# Patient Record
Sex: FEMALE | Race: BLACK OR AFRICAN AMERICAN | Employment: FULL TIME | URBAN - METROPOLITAN AREA
[De-identification: names, ages, dates, MRNs, and addresses within clinical notes are randomized per-mention and may not be internally consistent; named-entity substitution may affect disease eponyms.]

---

## 2023-02-09 ENCOUNTER — HOSPITAL ENCOUNTER (EMERGENCY)
Age: 23
Discharge: HOME | End: 2023-02-09
Attending: EMERGENCY MEDICINE
Payer: COMMERCIAL

## 2023-02-09 ENCOUNTER — APPOINTMENT (OUTPATIENT)
Dept: GENERAL RADIOLOGY | Age: 23
End: 2023-02-09
Payer: COMMERCIAL

## 2023-02-09 VITALS
DIASTOLIC BLOOD PRESSURE: 86 MMHG | WEIGHT: 198 LBS | BODY MASS INDEX: 33.8 KG/M2 | HEART RATE: 77 BPM | TEMPERATURE: 98.6 F | HEIGHT: 64 IN | OXYGEN SATURATION: 100 % | RESPIRATION RATE: 16 BRPM | SYSTOLIC BLOOD PRESSURE: 122 MMHG

## 2023-02-09 DIAGNOSIS — R09.89 GLOBUS SENSATION: Primary | ICD-10-CM

## 2023-02-09 LAB — STREP, MOLECULAR: NOT DETECTED

## 2023-02-09 PROCEDURE — 87651 STREP A DNA AMP PROBE: CPT

## 2023-02-09 PROCEDURE — 70360 X-RAY EXAM OF NECK: CPT

## 2023-02-09 PROCEDURE — 99283 EMERGENCY DEPT VISIT LOW MDM: CPT

## 2023-02-09 RX ORDER — ALBUTEROL SULFATE 90 UG/1
2 AEROSOL, METERED RESPIRATORY (INHALATION) EVERY 6 HOURS PRN
COMMUNITY
End: 2023-02-09

## 2023-02-09 RX ORDER — HYOSCYAMINE SULFATE 0.125 MG
125 TABLET ORAL EVERY 4 HOURS PRN
COMMUNITY

## 2023-02-09 RX ORDER — ALBUTEROL SULFATE 90 UG/1
2 AEROSOL, METERED RESPIRATORY (INHALATION) EVERY 4 HOURS PRN
COMMUNITY
Start: 2023-01-17

## 2023-02-09 ASSESSMENT — ENCOUNTER SYMPTOMS
VOICE CHANGE: 0
SORE THROAT: 1
TROUBLE SWALLOWING: 0

## 2023-02-09 ASSESSMENT — LIFESTYLE VARIABLES
HOW OFTEN DO YOU HAVE A DRINK CONTAINING ALCOHOL: NEVER
HOW MANY STANDARD DRINKS CONTAINING ALCOHOL DO YOU HAVE ON A TYPICAL DAY: PATIENT DOES NOT DRINK

## 2023-02-09 ASSESSMENT — PAIN SCALES - GENERAL: PAINLEVEL_OUTOF10: 3

## 2023-02-09 ASSESSMENT — PAIN - FUNCTIONAL ASSESSMENT: PAIN_FUNCTIONAL_ASSESSMENT: 0-10

## 2023-02-09 NOTE — ED TRIAGE NOTES
Reports feeling like something is stuck in her throat since Monday after eating. No problems talking, swallowing or taking in PO.  Reports discomfort

## 2023-02-09 NOTE — ED NOTES
I have reviewed discharge instructions with the patient. The patient verbalized understanding. Patient left ED via Discharge Method: ambulatory to Home. Opportunity for questions and clarification provided. Patient given 0 scripts. To continue your aftercare when you leave the hospital, you may receive an automated call from our care team to check in on how you are doing. This is a free service and part of our promise to provide the best care and service to meet your aftercare needs.  If you have questions, or wish to unsubscribe from this service please call [phone]. Thank you for Choosing our [address] [place] [address] Emergency Department.         Kylie Escalera RN  02/09/23 2304

## 2023-02-09 NOTE — ED PROVIDER NOTES
Emergency Department Provider Note                   PCP:                Asuncion Snow MD               Age: 20 y.o. Sex: female     No diagnosis found. DISPOSITION          Medical Decision Making  We will obtain an x-ray to evaluate for any radiopaque foreign body as well as any evidence of inflammation around the larynx or glottis. We will also check a strep test for possible early infection. Amount and/or Complexity of Data Reviewed  Labs: ordered. Radiology: ordered and independent interpretation performed. Decision-making details documented in ED Course. Details: No abnormality noted per my interpretation       Complexity of Problem: 1 self limited or minor problem. (2)    I have conducted an independent ordering and review of X-rays. Orders Placed This Encounter   Procedures    Group A Strep Screen By PCR    XR NECK SOFT TISSUE        Medications - No data to display    New Prescriptions    No medications on file        Anthony Downing is a 20 y.o. female who presents to the Emergency Department with chief complaint of    Chief Complaint   Patient presents with    Food Intolerance      Patient presents for evaluation, complaining of the feeling of something stuck in her throat after having eaten some food from [employer] 2 days ago. She reports a persistent sensation of foreign body in the throat although she reports no significant worsening pain with swallowing. And she is able to swallow fluids as well as solids. She denies any fever chills or cough. Review of systems is otherwise negative. The history is provided by the patient. Review of Systems   Constitutional:  Negative for chills and fever. HENT:  Positive for sore throat. Negative for trouble swallowing and voice change. All other systems reviewed and are negative. No past medical history on file. No past surgical history on file. No family history on file.      Social History Socioeconomic History    Marital status: Single         Shellfish-derived products     Previous Medications    ALBUTEROL SULFATE HFA (PROVENTIL;VENTOLIN;PROAIR) 108 (90 BASE) MCG/ACT INHALER    Inhale 2 puffs into the lungs every 4 hours as needed    BUDESONIDE (UCERIS PO)    Take 9 mg by mouth    HYOSCYAMINE (ANASPAZ;LEVSIN) 125 MCG TABLET    Take 125 mcg by mouth every 4 hours as needed for Cramping        Vitals signs and nursing note reviewed. Patient Vitals for the past 4 hrs:   Temp Pulse Resp BP SpO2   02/09/23 1158 98.6 °F (37 °C) (!) 106 17 132/89 100 %          Physical Exam  Vitals and nursing note reviewed. Constitutional:       General: She is not in acute distress. Appearance: Normal appearance. She is not ill-appearing, toxic-appearing or diaphoretic. HENT:      Head: Normocephalic and atraumatic. Mouth/Throat:      Mouth: Mucous membranes are moist.      Pharynx: Oropharynx is clear. No oropharyngeal exudate or posterior oropharyngeal erythema. Comments: No edema of the uvula or posterior pharynx is noted on examination. Eyes:      Extraocular Movements: Extraocular movements intact. Conjunctiva/sclera: Conjunctivae normal.      Pupils: Pupils are equal, round, and reactive to light. Neck:      Comments: No masses or thyromegaly noted. Musculoskeletal:         General: Normal range of motion. Cervical back: Normal range of motion and neck supple. No rigidity or tenderness. Lymphadenopathy:      Cervical: No cervical adenopathy. Skin:     General: Skin is warm and dry. Capillary Refill: Capillary refill takes less than 2 seconds. Neurological:      General: No focal deficit present. Mental Status: She is alert and oriented to person, place, and time. Mental status is at baseline. Psychiatric:         Mood and Affect: Mood normal.         Behavior: Behavior normal.         Thought Content:  Thought content normal.        Procedures    Results for orders placed or performed during the hospital encounter of 02/09/23   Group A Strep Screen By PCR    Specimen: Throat   Result Value Ref Range    Strep, Molecular Not detected          XR NECK SOFT TISSUE    (Results Pending)                       Voice dictation software was used during the making of this note. This software is not perfect and grammatical and other typographical errors may be present. This note has not been completely proofread for errors.      Andres Osgood,   02/09/23 4770